# Patient Record
Sex: FEMALE | URBAN - METROPOLITAN AREA
[De-identification: names, ages, dates, MRNs, and addresses within clinical notes are randomized per-mention and may not be internally consistent; named-entity substitution may affect disease eponyms.]

---

## 2020-10-29 ENCOUNTER — APPOINTMENT (RX ONLY)
Dept: URBAN - METROPOLITAN AREA CLINIC 55 | Facility: CLINIC | Age: 70
Setting detail: DERMATOLOGY
End: 2020-10-29

## 2020-10-29 DIAGNOSIS — Z41.9 ENCOUNTER FOR PROCEDURE FOR PURPOSES OTHER THAN REMEDYING HEALTH STATE, UNSPECIFIED: ICD-10-CM

## 2020-10-29 PROCEDURE — ? ADDITIONAL NOTES

## 2020-10-29 PROCEDURE — ? SUBCUTANEOUS HORMONE PELLET IMPLANTATION

## 2020-10-29 ASSESSMENT — LOCATION SIMPLE DESCRIPTION DERM: LOCATION SIMPLE: RIGHT BUTTOCK

## 2020-10-29 ASSESSMENT — LOCATION DETAILED DESCRIPTION DERM: LOCATION DETAILED: RIGHT BUTTOCK

## 2020-10-29 ASSESSMENT — LOCATION ZONE DERM: LOCATION ZONE: TRUNK

## 2020-10-29 NOTE — PROCEDURE: ADDITIONAL NOTES
Detail Level: Simple
Additional Notes: Patient has scheduled annual physical with PCP December 2020.

## 2021-04-01 ENCOUNTER — APPOINTMENT (RX ONLY)
Dept: URBAN - METROPOLITAN AREA CLINIC 55 | Facility: CLINIC | Age: 71
Setting detail: DERMATOLOGY
End: 2021-04-01

## 2021-04-01 DIAGNOSIS — Z41.9 ENCOUNTER FOR PROCEDURE FOR PURPOSES OTHER THAN REMEDYING HEALTH STATE, UNSPECIFIED: ICD-10-CM

## 2021-04-01 PROCEDURE — ? SUBCUTANEOUS HORMONE PELLET IMPLANTATION

## 2021-04-01 ASSESSMENT — LOCATION DETAILED DESCRIPTION DERM: LOCATION DETAILED: LEFT BUTTOCK

## 2021-04-01 ASSESSMENT — LOCATION SIMPLE DESCRIPTION DERM: LOCATION SIMPLE: LEFT BUTTOCK

## 2021-04-01 ASSESSMENT — LOCATION ZONE DERM: LOCATION ZONE: TRUNK

## 2021-04-01 NOTE — PROCEDURE: SUBCUTANEOUS HORMONE PELLET IMPLANTATION
Anesthesia Type: 1% lidocaine with epinephrine and a 1:10 solution of 8.4% sodium bicarbonate
Hide Expiration Date: No
Pellet Placement Text (Pellets Of Testosterone.....Xxx): and or estradiol were then placed into the trocar and slid into place under the skin, approximately 1.5 inches from the incision without difficulty. The trocar was then withdrawn and the wound was closed with steri-strips and a dressing was applied. The patient applied pressure to the wound for 4 minutes. There was less than 1cc of blood loss during the case. The patient was given a post-op instruction sheet and has been instructed to call us if she experiences any problems.
Pre-Operative Text: The patient was placed in a lateral recumbent position. The skin 2-3 inches below the waistline in the upper outer quadrant of the buttocks was cleansed with alcohol. See diagram.
Anesthesia Volume In Cc: 8
Total Testosterone Mg (Optional): 175
Number Of Testosterone Pellets: 3
Number Of Estradiol Pellets: 1
Anesthesia Text (1% Lidocaine With Epinephrine (1.5cc)......): was injected subdermally 2 inches in a horizontal fashion to achieve local anesthesia.
Total Estradiol Mg (Optional): 10
Testosterone Expiration Date (Optional): 12/30/21; 12/23/21; 12/23/21
Detail Level: None
Post-Care Instructions: I reviewed with the patient in detail post-care instructions. Patient understands to call the clinic if there is any redness, swelling or pain. A detailed post-insertion hand was reviewed and provided to the patient.
Incision And Trocar Text: After ensuring adequate anesthesia, a 4mm stab incision was made. A 3.5mm trocar was introduced through the incision into the subcutaneous fat coursing horizontally toward the hip. The stylus was then removed.
Price (Use Numbers Only, No Special Characters Or $): 209
Testosterone Lot Number (Optional): [id number]; [id number]; [id number]
Estradiol Expiration Date (Optional): 12/11/21
Estradiol Lot Number (Optional): [id number]

## 2021-04-06 ENCOUNTER — APPOINTMENT (RX ONLY)
Dept: URBAN - METROPOLITAN AREA CLINIC 55 | Facility: CLINIC | Age: 71
Setting detail: DERMATOLOGY
End: 2021-04-06

## 2021-04-06 DIAGNOSIS — Z41.9 ENCOUNTER FOR PROCEDURE FOR PURPOSES OTHER THAN REMEDYING HEALTH STATE, UNSPECIFIED: ICD-10-CM

## 2021-04-06 PROCEDURE — ? COSMETIC CONSULTATION: GENERAL

## 2021-04-09 ENCOUNTER — APPOINTMENT (RX ONLY)
Dept: URBAN - METROPOLITAN AREA CLINIC 55 | Facility: CLINIC | Age: 71
Setting detail: DERMATOLOGY
End: 2021-04-09

## 2021-04-09 DIAGNOSIS — Z41.9 ENCOUNTER FOR PROCEDURE FOR PURPOSES OTHER THAN REMEDYING HEALTH STATE, UNSPECIFIED: ICD-10-CM

## 2021-04-09 PROCEDURE — ? SCULPTRA

## 2021-04-09 PROCEDURE — ? FILLERS

## 2021-04-09 NOTE — PROCEDURE: SCULPTRA
Right Dorsal Hand Filler Volume In Cc: 0
Show Second Additional Location?: Yes
Additional Anesthesia Volume In Cc: 6
Show Right And Left Temple Volume?: No
Vials Reconstituted (Required For Inventory): 1
Injection Technique: The Sculptra was injected to the areas shown in black with a 25g cannula after prepping the skin with alcohol and/or chlorhexidine and providing appropriate anesthesia.
Anesthesia Type: 1% lidocaine with epinephrine
Consent: Verbal consent was obtained.
Anesthesia Volume In Cc: 0.5
Dilution Method: The Sculptra was reconstituted with 8cc sterile water and 2cc 2% plain lidocaine for each vial.
Detail Level: Simple
Volumizer: Sculptra
Map Statement: See Attached Map for Complete Details.
Expiration Date (Month Year): 9/30/2023
Lot #: [id number]
Post-Care Instructions: After care instructions were provided to the patient.

## 2021-04-09 NOTE — PROCEDURE: FILLERS
Expiration Date (Month Year): 7/3/23
Additional Area 3 Volume In Cc: 0
Include Cannula Information In Note?: No
Include Cannula Size?: 27G
Filler: RHA 3
Marionette Lines Filler Volume In Cc: 1
Map Statment: See Attach Map for Complete Details
Filler: RHA 4
Expiration Date (Month Year): 08/21/2023
Anesthesia Type: 1% lidocaine with epinephrine
Expiration Date (Month Year): 8/21/23
Lot #: [id number]
Anesthesia Volume In Cc: 0.5
Detail Level: Simple
Consent: Verbal consent was obtained.
Filler Comments: Cheeks, lateral TT
Lot #: [id number]
Include Cannula Information In Note?: Yes
Post-Care Instructions: After care instructions were provided to the patient.

## 2021-05-14 ENCOUNTER — RX ONLY (OUTPATIENT)
Age: 71
Setting detail: RX ONLY
End: 2021-05-14

## 2021-05-14 RX ORDER — PROGESTERONE 200 MG/1
CAPSULE ORAL
Qty: 90 | Refills: 1 | Status: ERX | COMMUNITY
Start: 2021-05-14

## 2021-05-21 ENCOUNTER — APPOINTMENT (RX ONLY)
Dept: URBAN - METROPOLITAN AREA CLINIC 55 | Facility: CLINIC | Age: 71
Setting detail: DERMATOLOGY
End: 2021-05-21

## 2021-05-21 DIAGNOSIS — Z41.9 ENCOUNTER FOR PROCEDURE FOR PURPOSES OTHER THAN REMEDYING HEALTH STATE, UNSPECIFIED: ICD-10-CM

## 2021-05-21 PROCEDURE — ? SCULPTRA

## 2021-05-21 NOTE — PROCEDURE: SCULPTRA
Right Pa Filler Volume In Cc: 0
Lot #: [id number]
Show Zygomatic Arches Volume?: Yes
Show Right And Left Mmc Volume?: No
Anesthesia Volume In Cc: 0.5
Consent: Written consent was obtained.
Detail Level: Simple
Vials Reconstituted (Required For Inventory): 1
Volumizer: Sculptra
Additional Anesthesia Volume In Cc: 6
Dilution Method: The Sculptra was reconstituted with 8cc sterile water and 2cc 2% plain lidocaine for each vial.
Post-Care Instructions: After care instructions were provided to the patient.
Injection Technique: The Sculptra was injected to the areas shown in black with a 25g cannula after prepping the skin with alcohol and/or chlorhexidine and providing appropriate anesthesia.
Map Statement: See Attached Map for Complete Details.
Expiration Date (Month Year): 9/30/23
Anesthesia Type: 1% lidocaine with epinephrine

## 2021-07-28 ENCOUNTER — APPOINTMENT (RX ONLY)
Dept: URBAN - METROPOLITAN AREA CLINIC 55 | Facility: CLINIC | Age: 71
Setting detail: DERMATOLOGY
End: 2021-07-28

## 2021-07-28 DIAGNOSIS — Z41.9 ENCOUNTER FOR PROCEDURE FOR PURPOSES OTHER THAN REMEDYING HEALTH STATE, UNSPECIFIED: ICD-10-CM

## 2021-07-28 PROCEDURE — ? SCULPTRA

## 2021-07-28 NOTE — PROCEDURE: SCULPTRA
Show Right And Left Pa Volume?: No
Right Tear Trough Filler Volume In Cc: 0
Volumizer: Sculptra
Show Nasolabial Folds Volume?: Yes
Post-Care Instructions: After care instructions were provided to the patient.
Expiration Date (Month Year): 1/31/2024
Additional Anesthesia Volume In Cc: 6
Consent: Written consent was obtained.
Dilution Method: The Sculptra was reconstituted with 8cc sterile water and 2cc 2% plain lidocaine for each vial.
Detail Level: Simple
Anesthesia Volume In Cc: 0.5
Vials Reconstituted (Required For Inventory): 1
Injection Technique: The Sculptra was injected to the areas shown in black with a 25g cannula after prepping the skin with alcohol and/or chlorhexidine and providing appropriate anesthesia.
Map Statement: See Attached Map for Complete Details.
Lot #: [id number]
Anesthesia Type: 1% lidocaine with epinephrine

## 2021-09-08 ENCOUNTER — APPOINTMENT (RX ONLY)
Dept: URBAN - METROPOLITAN AREA CLINIC 55 | Facility: CLINIC | Age: 71
Setting detail: DERMATOLOGY
End: 2021-09-08

## 2021-09-08 DIAGNOSIS — Z41.9 ENCOUNTER FOR PROCEDURE FOR PURPOSES OTHER THAN REMEDYING HEALTH STATE, UNSPECIFIED: ICD-10-CM

## 2021-09-08 PROCEDURE — ? FILLERS

## 2021-09-08 PROCEDURE — ? SUBCUTANEOUS HORMONE PELLET IMPLANTATION

## 2021-09-08 ASSESSMENT — LOCATION SIMPLE DESCRIPTION DERM: LOCATION SIMPLE: RIGHT BUTTOCK

## 2021-09-08 ASSESSMENT — LOCATION ZONE DERM: LOCATION ZONE: TRUNK

## 2021-09-08 ASSESSMENT — LOCATION DETAILED DESCRIPTION DERM: LOCATION DETAILED: RIGHT BUTTOCK

## 2021-09-08 NOTE — PROCEDURE: FILLERS
Dorsal Hands Filler Volume In Cc: 0
Include Cannula Size?: 27G
Include Cannula Information In Note?: No
Filler: Jones Sandoval
Anesthesia Type: 1% lidocaine with epinephrine
Consent: Written consent was obtained.
Lot #: [id number]
Map Statment: See Attach Map for Complete Details
Expiration Date (Month Year): 01/31/2024
Include Cannula Information In Note?: Yes
Anesthesia Volume In Cc: 0.5
Detail Level: Simple
Post-Care Instructions: After care instructions were provided to the patient.

## 2021-09-08 NOTE — PROCEDURE: SUBCUTANEOUS HORMONE PELLET IMPLANTATION
Anesthesia Type: 1% lidocaine with epinephrine and a 1:10 solution of 8.4% sodium bicarbonate
Detail Level: None
Total Estradiol Mg (Optional): 6
Estradiol Lot Number (Optional): [id number]
Hide Expiration Date: No
Pre-Operative Text: The patient was placed in a lateral recumbent position. The skin 2-3 inches below the waistline in the upper outer quadrant of the buttocks was cleansed with alcohol. See diagram.
Anesthesia Volume In Cc: 8
Post-Care Instructions: I reviewed with the patient in detail post-care instructions. Patient understands to call the clinic if there is any redness, swelling or pain. A detailed post-insertion hand was reviewed and provided to the patient.
Incision And Trocar Text: After ensuring adequate anesthesia, a 4mm stab incision was made. A 3.5mm trocar was introduced through the incision into the subcutaneous fat coursing horizontally toward the hip. The stylus was then removed.
Testosterone Lot Number (Optional): [id number]; [id number]; [id number]
Number Of Estradiol Pellets: 1
Total Testosterone Mg (Optional): 175
Pellet Placement Text (Pellets Of Testosterone.....Xxx): and or estradiol were then placed into the trocar and slid into place under the skin, approximately 1.5 inches from the incision without difficulty. The trocar was then withdrawn and the wound was closed with steri-strips and a dressing was applied. The patient applied pressure to the wound for 4 minutes. There was less than 1cc of blood loss during the case. The patient was given a post-op instruction sheet and has been instructed to call us if she experiences any problems.
Number Of Testosterone Pellets: 3
Anesthesia Text (1% Lidocaine With Epinephrine (1.5cc)......): was injected subdermally 2 inches in a horizontal fashion to achieve local anesthesia.
Estradiol Expiration Date (Optional): 05/19/22
Testosterone Expiration Date (Optional): 7/3/22; 6/27/22; 5/16/22
Price (Use Numbers Only, No Special Characters Or $): 571

## 2021-11-04 ENCOUNTER — APPOINTMENT (RX ONLY)
Dept: URBAN - METROPOLITAN AREA CLINIC 55 | Facility: CLINIC | Age: 71
Setting detail: DERMATOLOGY
End: 2021-11-04

## 2021-11-04 VITALS — WEIGHT: 186.4 LBS | HEIGHT: 68.5 IN

## 2021-11-04 DIAGNOSIS — Z41.9 ENCOUNTER FOR PROCEDURE FOR PURPOSES OTHER THAN REMEDYING HEALTH STATE, UNSPECIFIED: ICD-10-CM

## 2021-11-04 PROCEDURE — ? COOLSCULPTING

## 2021-11-04 NOTE — PROCEDURE: COOLSCULPTING
Treatment Administered By (Optional): Kat
Suction Settings: The suction settings were per protocol.
Consent obtained, risks reviewed.
Intro: Prior to treatment, the area was cleaned with alcohol and marked out with a marking pen. The gel sheet was then applied uniformly. The applicator was applied to the skin with good contact.
Applicator Size: CoolAdvantage Petite Core
Time (Minutes - Will Only Render If Nonzero): 35
Time (Minutes - Will Only Render If Nonzero): 0
Applicator Size: CoolAdvantage Core
Applicator Size: CoolAdvantage Curve
Detail Level: Simple
Device: Coolsculpting
Applicator Size: standard applicator
Patient Weight (Optional): 186
Post Treatment: After treatment, the applicator was removed from the skin. 2 minute immediate post procedure massage was administered.
Location 1: lower abdomen (left)
Location 2: lower abdomen (right)
Price (Use Numbers Only, No Special Characters Or $): 900

## 2022-01-26 ENCOUNTER — APPOINTMENT (RX ONLY)
Dept: URBAN - METROPOLITAN AREA CLINIC 55 | Facility: CLINIC | Age: 72
Setting detail: DERMATOLOGY
End: 2022-01-26

## 2022-01-26 DIAGNOSIS — Z41.9 ENCOUNTER FOR PROCEDURE FOR PURPOSES OTHER THAN REMEDYING HEALTH STATE, UNSPECIFIED: ICD-10-CM

## 2022-01-26 PROCEDURE — ? SUBCUTANEOUS HORMONE PELLET IMPLANTATION

## 2022-01-26 ASSESSMENT — LOCATION DETAILED DESCRIPTION DERM: LOCATION DETAILED: LEFT BUTTOCK

## 2022-01-26 ASSESSMENT — LOCATION ZONE DERM: LOCATION ZONE: TRUNK

## 2022-01-26 ASSESSMENT — LOCATION SIMPLE DESCRIPTION DERM: LOCATION SIMPLE: LEFT BUTTOCK

## 2022-01-26 NOTE — PROCEDURE: SUBCUTANEOUS HORMONE PELLET IMPLANTATION
Post-Care Instructions: I reviewed with the patient in detail post-care instructions. Patient understands to call the clinic if there is any redness, swelling or pain. A detailed post-insertion hand was reviewed and provided to the patient.
Incision And Trocar Text: After ensuring adequate anesthesia, a 4mm stab incision was made. A 3.5mm trocar was introduced through the incision into the subcutaneous fat coursing horizontally toward the hip. The stylus was then removed.
Detail Level: None
Pre-Operative Text: The patient was placed in a lateral recumbent position. The skin 2-3 inches below the waistline in the upper outer quadrant of the buttocks was cleansed with alcohol. See diagram.
Estradiol Lot Number (Optional): [id number]
Testosterone Lot Number (Optional): [id number] 100mg, [id number] 50mg, [id number] 25mg
Anesthesia Type: 1% lidocaine with epinephrine and a 1:10 solution of 8.4% sodium bicarbonate
Price (Use Numbers Only, No Special Characters Or $): 879
Total Testosterone Mg (Optional): 175
Number Of Estradiol Pellets: 1
Hide Expiration Date: No
Estradiol Expiration Date (Optional): 10/27/2022
Number Of Testosterone Pellets: 3
Total Estradiol Mg (Optional): 10
Pellet Placement Text (Pellets Of Testosterone.....Xxx): and or estradiol were then placed into the trocar and slid into place under the skin, approximately 1.5 inches from the incision without difficulty. The trocar was then withdrawn and the wound was closed with steri-strips and a dressing was applied. The patient applied pressure to the wound for 4 minutes. There was less than 1cc of blood loss during the case. The patient was given a post-op instruction sheet and has been instructed to call us if she experiences any problems.
Testosterone Expiration Date (Optional): 10/15/2022, 9/19/2022, 9/15/2022
Anesthesia Text (1% Lidocaine With Epinephrine (1.5cc)......): was injected subdermally 2 inches in a horizontal fashion to achieve local anesthesia.
Anesthesia Volume In Cc: 8

## 2022-06-14 ENCOUNTER — APPOINTMENT (RX ONLY)
Dept: URBAN - METROPOLITAN AREA CLINIC 55 | Facility: CLINIC | Age: 72
Setting detail: DERMATOLOGY
End: 2022-06-14

## 2022-06-14 DIAGNOSIS — Z41.9 ENCOUNTER FOR PROCEDURE FOR PURPOSES OTHER THAN REMEDYING HEALTH STATE, UNSPECIFIED: ICD-10-CM

## 2022-06-14 PROCEDURE — ? SUBCUTANEOUS HORMONE PELLET IMPLANTATION

## 2022-06-14 NOTE — PROCEDURE: SUBCUTANEOUS HORMONE PELLET IMPLANTATION
Detail Level: None
Mabel Lot Number: No
Pellet Placement Text (Pellets Of Testosterone.....Xxx): and or estradiol were then placed into the trocar and slid into place under the skin, approximately 1.5 inches from the incision without difficulty. The trocar was then withdrawn and the wound was closed with steri-strips and a dressing was applied. The patient applied pressure to the wound for 4 minutes. There was less than 1cc of blood loss during the case. The patient was given a post-op instruction sheet and has been instructed to call us if she experiences any problems.
Pre-Operative Text: The patient was placed in a lateral recumbent position. The skin 2-3 inches below the waistline in the upper outer quadrant of the buttocks was cleansed with alcohol. See diagram.
Anesthesia Text (1% Lidocaine With Epinephrine (1.5cc)......): was injected subdermally 2 inches in a horizontal fashion to achieve local anesthesia.
Testosterone Lot Number (Optional): [id number] 50mg; [id number] 100mg; [id number] 25mg
Anesthesia Type: 1% lidocaine with epinephrine and a 1:10 solution of 8.4% sodium bicarbonate
Post-Care Instructions: I reviewed with the patient in detail post-care instructions. Patient understands to call the clinic if there is any redness, swelling or pain. A detailed post-insertion hand was reviewed and provided to the patient.
Number Of Estradiol Pellets: 1
Total Estradiol Mg (Optional): 6
Testosterone Expiration Date (Optional): 12/09/22; 11/11/22; 12/09/22
Total Testosterone Mg (Optional): 175
Price (Use Numbers Only, No Special Characters Or $): 886
Estradiol Expiration Date (Optional): 12/09/22
Number Of Testosterone Pellets: 3
Incision And Trocar Text: After ensuring adequate anesthesia, a 4mm stab incision was made. A 3.5mm trocar was introduced through the incision into the subcutaneous fat coursing horizontally toward the hip. The stylus was then removed.
Estradiol Lot Number (Optional): [id number]
Anesthesia Volume In Cc: 8

## 2022-12-07 ENCOUNTER — APPOINTMENT (RX ONLY)
Dept: URBAN - METROPOLITAN AREA CLINIC 55 | Facility: CLINIC | Age: 72
Setting detail: DERMATOLOGY
End: 2022-12-07

## 2022-12-07 DIAGNOSIS — Z41.9 ENCOUNTER FOR PROCEDURE FOR PURPOSES OTHER THAN REMEDYING HEALTH STATE, UNSPECIFIED: ICD-10-CM

## 2022-12-07 PROCEDURE — ? HYALURONIDASE INJECTION

## 2022-12-07 PROCEDURE — ? SCULPTRA

## 2022-12-07 PROCEDURE — ? FILLERS

## 2022-12-07 PROCEDURE — ? INVENTORY

## 2022-12-07 ASSESSMENT — LOCATION DETAILED DESCRIPTION DERM
LOCATION DETAILED: RIGHT CENTRAL MALAR CHEEK
LOCATION DETAILED: LEFT CENTRAL MALAR CHEEK

## 2022-12-07 ASSESSMENT — LOCATION SIMPLE DESCRIPTION DERM
LOCATION SIMPLE: RIGHT CHEEK
LOCATION SIMPLE: LEFT CHEEK

## 2022-12-07 ASSESSMENT — LOCATION ZONE DERM: LOCATION ZONE: FACE

## 2022-12-07 NOTE — PROCEDURE: SCULPTRA
Right Nasolabial Fold Filler Volume In Cc: 0
Show Mental Crease Volume?: Yes
Consent: Written consent obtained.
Show Right And Left Temple Volume?: No
Detail Level: Detailed
Post-Care Instructions: After care instructions were provided to the patient.
Show Inventory Tab: Show
Anesthesia Type: 1% lidocaine with epinephrine
Dilution Method: The Sculptra was reconstituted with 8 ml of sterile water and 2cc 2% plain lidocaine for each vial.
Anesthesia Volume In Cc: 0.5
Injection Technique: The Sculptra was injected to the areas shown in black with a 25g cannula after prepping the skin with alcohol and /or chlorhexidine and providing appropriate anesthesia.
Vials Reconstituted (Required For Inventory): 1

## 2022-12-07 NOTE — PROCEDURE: FILLERS
Post-Care Instructions: After care instructions were provided verbally and in writing.
Nasolabial Folds Filler Volume In Cc: 0
Use Map Statement For Sites (Optional): No
Filler: Juvederm Voluma XC
Map Statment: See Attach Map for Complete Details
Include Cannula Information In Note?: Yes
Anesthesia Type: 1% lidocaine with epinephrine
Anesthesia Volume In Cc: 0.5
Include Cannula Size?: 27G
Detail Level: Detailed
Consent was obtained.

## 2022-12-07 NOTE — PROCEDURE: HYALURONIDASE INJECTION
Detail Level: Detailed
Total Volume (Ccs): 0.3
Hyaluronidase Preparation: hyaluronidase
Consent: The risks of contour defects and dimpling of the skin were reviewed with the patient prior to the injection.

## 2023-03-02 ENCOUNTER — APPOINTMENT (RX ONLY)
Dept: URBAN - METROPOLITAN AREA CLINIC 55 | Facility: CLINIC | Age: 73
Setting detail: DERMATOLOGY
End: 2023-03-02

## 2023-03-02 VITALS — WEIGHT: 175 LBS | HEIGHT: 68 IN

## 2023-03-02 DIAGNOSIS — Z41.9 ENCOUNTER FOR PROCEDURE FOR PURPOSES OTHER THAN REMEDYING HEALTH STATE, UNSPECIFIED: ICD-10-CM

## 2023-03-02 PROCEDURE — ? COOLSCULPTING

## 2023-03-02 PROCEDURE — ? INVENTORY

## 2023-03-02 NOTE — PROCEDURE: COOLSCULPTING
Applicator Size: standard applicator
Suction Settings: The suction settings were per protocol.
Location 1: mid abdomen (right)
Number Of Cycles: 1
Patient Weight (Optional): 175
Treatment Administered By (Optional): Kat
Location 2: mid abdomen (left)
Time (Minutes - Will Only Render If Nonzero): 0
Device: Coolsculpting
Applicator Size: CoolAdvantage Plus
Applicator Size: CalciMed[employer] PRO applicator
Time (Minutes - Will Only Render If Nonzero): 75
Post Treatment: After treatment, the applicator was removed from the skin and a two minute massage was performed according to protocol.
Intro: Prior to treatment, the area was cleaned with alcohol and marked out with a marking pen. The gel sheet was then applied uniformly. The applicator was applied to the skin with good contact.
Time (Minutes - Will Only Render If Nonzero): 45
Detail Level: Zone
Applicator Size: CoolAdvantage Core
Time (Minutes - Will Only Render If Nonzero): 35
Consent obtained, risks reviewed.

## 2023-05-30 ENCOUNTER — APPOINTMENT (RX ONLY)
Dept: URBAN - METROPOLITAN AREA CLINIC 55 | Facility: CLINIC | Age: 73
Setting detail: DERMATOLOGY
End: 2023-05-30

## 2023-05-30 VITALS — WEIGHT: 180 LBS

## 2023-05-30 DIAGNOSIS — Z41.9 ENCOUNTER FOR PROCEDURE FOR PURPOSES OTHER THAN REMEDYING HEALTH STATE, UNSPECIFIED: ICD-10-CM

## 2023-05-30 PROCEDURE — ? INVENTORY

## 2023-05-30 PROCEDURE — ? COSMETIC FOLLOW-UP

## 2023-05-30 NOTE — PROCEDURE: COSMETIC FOLLOW-UP
Global Improvement: Minimal
Detail Level: Simple
Patient Satisfaction: Pleased
Treatment (Optional): CoolSculpting
Side Effects Or Complications: None

## 2023-08-30 ENCOUNTER — APPOINTMENT (RX ONLY)
Dept: URBAN - METROPOLITAN AREA CLINIC 55 | Facility: CLINIC | Age: 73
Setting detail: DERMATOLOGY
End: 2023-08-30

## 2023-08-30 DIAGNOSIS — Z41.9 ENCOUNTER FOR PROCEDURE FOR PURPOSES OTHER THAN REMEDYING HEALTH STATE, UNSPECIFIED: ICD-10-CM

## 2023-08-30 PROCEDURE — ? FILLERS

## 2023-08-30 PROCEDURE — ? INVENTORY

## 2023-08-30 PROCEDURE — ? SCULPTRA

## 2023-08-30 NOTE — PROCEDURE: FILLERS
Tear Troughs Filler Volume In Cc: 0
Anesthesia Volume In Cc: 0.5
Include Cannula Information In Note?: No
Anesthesia Type: 1% lidocaine with epinephrine
Include Cannula Information In Note?: Yes
Include Cannula Size?: 27G
Consent was obtained.
Detail Level: Detailed
Post-Care Instructions: After care instructions were provided verbally and in writing.
Filler: Restylane Defyne
Map Statment: See Attach Map for Complete Details

## 2023-08-30 NOTE — PROCEDURE: SCULPTRA
Left Nasolabial Fold Filler Volume In Cc: 0
Show Depth Variable?: Yes
Show Right And Left Jawline Volume?: No
Vials Reconstituted (Required For Inventory): 1
Show Inventory Tab: Show
Post-Care Instructions: After care instructions were provided to the patient.
Anesthesia Type: 1% lidocaine with epinephrine
Dilution Method: The Sculptra was reconstituted with 8 ml of sterile water and 2cc 2% plain lidocaine for each vial.
Anesthesia Volume In Cc: 0.5
Injection Technique: The Sculptra was injected to the areas shown in black with a 25g cannula after prepping the skin with alcohol and /or chlorhexidine and providing appropriate anesthesia.
Detail Level: Detailed
Consent: Written consent obtained.

## 2023-10-25 ENCOUNTER — APPOINTMENT (RX ONLY)
Dept: URBAN - METROPOLITAN AREA CLINIC 55 | Facility: CLINIC | Age: 73
Setting detail: DERMATOLOGY
End: 2023-10-25

## 2023-10-25 DIAGNOSIS — Z41.9 ENCOUNTER FOR PROCEDURE FOR PURPOSES OTHER THAN REMEDYING HEALTH STATE, UNSPECIFIED: ICD-10-CM

## 2023-10-25 PROCEDURE — ? COSMETIC CONSULTATION: GENERAL

## 2023-10-25 PROCEDURE — ? PALOMAR VECTUS LASER HAIR REMOVAL

## 2023-10-25 PROCEDURE — ? INVENTORY

## 2023-10-25 NOTE — PROCEDURE: PALOMAR VECTUS LASER HAIR REMOVAL
Detail Level: Detailed
Fluence In J/Cm2: 23
Post-Care Instructions: I reviewed with the patient in detail post-care instructions. Patient should avoid sun for a minimum of 4 weeks before and after treatment.
Pre-Procedure: Prior to proceeding the treatment areas were cleaned and all present put on their eye protection.
Topical Anesthesia Type: 20% benzocaine, 8% lidocaine, 4% tetracaine
Cooling: DCD setting
Rate (Hz): Medium
External Cooling Fan Speed: 0
Skintel Number (Optional): 14
Pulse Duration (Include Units): 15 ms
Optic Size: 12 x 12mm
Render Post-Care In The Note: No
Laser Type: diode 810nm
Treatment Number: 1
Post-Procedure Care: Immediate endpoint: perifollicular erythema and edema. Post care was reviewed with the patient and all patient questions were answered to their satisfaction.
Consent obtained, risks reviewed.

## 2023-11-22 ENCOUNTER — APPOINTMENT (RX ONLY)
Dept: URBAN - METROPOLITAN AREA CLINIC 55 | Facility: CLINIC | Age: 73
Setting detail: DERMATOLOGY
End: 2023-11-22

## 2023-11-22 DIAGNOSIS — Z41.9 ENCOUNTER FOR PROCEDURE FOR PURPOSES OTHER THAN REMEDYING HEALTH STATE, UNSPECIFIED: ICD-10-CM

## 2023-11-22 PROCEDURE — ? PALOMAR VECTUS LASER HAIR REMOVAL

## 2023-11-22 PROCEDURE — ? INVENTORY

## 2023-11-22 NOTE — PROCEDURE: PALOMAR VECTUS LASER HAIR REMOVAL
Fluence In J/Cm2: 24
Detail Level: Detailed
Post-Care Instructions: I reviewed with the patient in detail post-care instructions. Patient should avoid sun for a minimum of 4 weeks before and after treatment.
Post-Procedure Care: Immediate endpoint: perifollicular erythema and edema. Post care was reviewed with the patient and all patient questions were answered to their satisfaction.
Skintel Number (Optional): 17
Optic Size: 12 x 12mm
Render Post-Care In The Note: No
Treatment Number: 2
Topical Anesthesia Type: 23% lidocaine, 7% tetracaine
Pulse Duration (Include Units): 15 ms
Rate (Hz): Medium
Laser Type: diode 810nm
External Cooling Fan Speed: 0
Pre-Procedure: Prior to proceeding the treatment areas were cleaned and all present put on their eye protection.
Cooling: DCD setting
Consent obtained, risks reviewed.

## 2023-12-05 ENCOUNTER — APPOINTMENT (RX ONLY)
Dept: URBAN - METROPOLITAN AREA CLINIC 55 | Facility: CLINIC | Age: 73
Setting detail: DERMATOLOGY
End: 2023-12-05

## 2023-12-05 DIAGNOSIS — Z41.9 ENCOUNTER FOR PROCEDURE FOR PURPOSES OTHER THAN REMEDYING HEALTH STATE, UNSPECIFIED: ICD-10-CM

## 2023-12-05 PROCEDURE — ? IN-HOUSE DISPENSING PHARMACY

## 2023-12-05 PROCEDURE — ? INVENTORY

## 2023-12-05 NOTE — PROCEDURE: IN-HOUSE DISPENSING PHARMACY
Product 5 Units Dispensed: 0
Product 36 Unit Type: mg
Product 7 Refills: 2
Name Of Product 1: Anti-Aging Brightening Cream
Product 7 Units Dispensed: 1
Detail Level: Zone
Product 4 Unit Type: ml
Product 6 Application Directions: Apply nightly or as directed. Use SPF daily.
Product 3 Refills: 11
Send Charges To Patient Encounter: No
Product 2 Amount/Unit (Numbers Only): 30
Name Of Product 4: Hydroquinone 8% Emulsion
Name Of Product 2: Dapsone / Spironolactone Gel
Render Refills If Set To 0: Yes
Product 2 Application Directions: Apply nightly or as directed.
Name Of Product 7: Lidocaine 23% / Tetracaine 7% Cream
Name Of Product 6: Rosacea Triple Combination Gel
Name Of Product 3: Acne Triple Combination Gel
Product 7 Application Directions: Apply only as directed
Name Of Product 5: Hydrating Tretinoin 0.025% Cream

## 2023-12-12 ENCOUNTER — APPOINTMENT (RX ONLY)
Dept: URBAN - METROPOLITAN AREA CLINIC 55 | Facility: CLINIC | Age: 73
Setting detail: DERMATOLOGY
End: 2023-12-12

## 2023-12-12 DIAGNOSIS — Z41.9 ENCOUNTER FOR PROCEDURE FOR PURPOSES OTHER THAN REMEDYING HEALTH STATE, UNSPECIFIED: ICD-10-CM

## 2023-12-12 PROCEDURE — ? INVENTORY

## 2023-12-12 PROCEDURE — ? FRAXEL

## 2023-12-12 NOTE — PROCEDURE: FRAXEL
Was An Eye Shield Used?: No
Treatment Level: 1
Treatment Level: 7
Small Plastic Eye Shield Text: The ocular mucosa was anesthetized with tetracaine. Once adequate anesthesia was optained, small plastic eye shields were inserted and remained in place until the procedure was completed.
Number Of Passes: 8
Topical Anesthesia Type: 23% lidocaine, 7% tetracaine
Wavelength: 1550nm
Anesthesia Volume In Cc: 0.3
Depth In Microns (Use Numbers Only, No Special Characters Or $): 199
Location: Use Location Override
Small Metal Eye Shield Text: The ocular mucosa was anesthetized with tetracaine. Once adequate anesthesia was optained, small metal eye shields were inserted and remained in place until the procedure was completed.
Medium Plastic Eye Shield Text: The ocular mucosa was anesthetized with tetracaine. Once adequate anesthesia was optained, medium plastic eye shields were inserted and remained in place until the procedure was completed.
Depth In Microns (Use Numbers Only, No Special Characters Or $): 5392
Length Of Topical Anesthesia Application (Optional): 60 minutes
Total Coverage: 30%
Detail Level: Zone
Location: full face
Total Coverage: 20%
Medium Metal Eye Shield Text: The ocular mucosa was anesthetized with tetracaine. Once adequate anesthesia was optained, medium metal eye shields were inserted and remained in place until the procedure was completed.
Post-Care Instructions: Topical anesthetic removed with gauze. Skin cleansed with a gentle  and prepped with Hibiclens.\\nCO2 lift mask applied post treatment and advised to keep on for 1 hour. Ice packs sent home with patient. \\nI reviewed with the patient in detail post-care instructions. Patient should avoid sun until area fully healed.
Large Plastic Eye Shield Text: The ocular mucosa was anesthetized with tetracaine. Once adequate anesthesia was optained, large plastic eye shields were inserted and remained in place until the procedure was completed.
External Cooling Fan Speed: 5
Energy(Mj/Cm2): 40
Indication: resurfacing
Total Energy In Kj (Optional- Don't Include Units): 5.23
Energy(Mj/Cm2): 15
Large Metal Eye Shield Text: The ocular mucosa was anesthetized with tetracaine. Once adequate anesthesia was optained, large metal eye shields were inserted and remained in place until the procedure was completed.
Add Post-Care Below To The Note: Yes
Energy(Mj/Cm2): 70
Treatment Level: 3
Consent obtained, risks reviewed.
Anesthesia Type: 1% lidocaine with epinephrine

## 2024-01-01 NOTE — PROCEDURE: SUBCUTANEOUS HORMONE PELLET IMPLANTATION
Total Estradiol Mg (Optional): 10
Estradiol Lot Number (Optional): [id number]
Total Testosterone Mg (Optional): 175
Number Of Estradiol Pellets: 1
Number Of Testosterone Pellets: 3
Hide Expiration Date: No
Incision And Trocar Text: After ensuring adequate anesthesia, a 4mm stab incision was made. A 3.5mm trocar was introduced through the incision into the subcutaneous fat coursing horizontally toward the hip. The stylus was then removed.
Pellet Placement Text (Pellets Of Testosterone.....Xxx): And estradiol were then placed into the trocar and slid into place under the skin, approximately 1.5 inches from the incision without difficulty. The trocar was then withdrawn and the wound was closed with steri-strips and a dressing was applied. The patient applied pressure to the wound for 4 minutes. There was less than 1cc of blood loss during the case. The patient was given a post-op instruction sheet and has been instructed to call us if she experiences any problems.
Testosterone Expiration Date (Optional): 08/19/2021, 08/19/21, 07/02/21
Anesthesia Type: 1% lidocaine with epinephrine and a 1:10 solution of 8.4% sodium bicarbonate
Post-Care Instructions: I reviewed with the patient in detail post-care instructions. Patient understands to call the clinic if there is any redness, swelling or pain. A detailed post-insertion hand was reviewed and provided to the patient.
Anesthesia Text (1% Lidocaine With Epinephrine (1.5cc)......): was injected subdermally 2 inches in a horizontal fashion to achieve local anesthesia.
Estradiol Expiration Date (Optional): 06/24/21
Price (Use Numbers Only, No Special Characters Or $): 585
Testosterone Lot Number (Optional): [id number], [id number]
Anesthesia Volume In Cc: 8
Detail Level: None
Pre-Operative Text: The patient was placed in a lateral recumbent position. The skin 2-3 inches below the waistline in the upper rightouter quadrant of the buttocks was cleansed with alcohol.
Yes-Patient/Caregiver accepts free interpretation services...

## 2024-01-02 ENCOUNTER — APPOINTMENT (RX ONLY)
Dept: URBAN - METROPOLITAN AREA CLINIC 55 | Facility: CLINIC | Age: 74
Setting detail: DERMATOLOGY
End: 2024-01-02

## 2024-01-02 DIAGNOSIS — Z41.9 ENCOUNTER FOR PROCEDURE FOR PURPOSES OTHER THAN REMEDYING HEALTH STATE, UNSPECIFIED: ICD-10-CM

## 2024-01-02 PROCEDURE — ? INVENTORY

## 2024-01-02 PROCEDURE — ? SIGNATURE HYDRAFACIAL

## 2024-01-02 ASSESSMENT — LOCATION ZONE DERM: LOCATION ZONE: FACE

## 2024-01-02 ASSESSMENT — LOCATION SIMPLE DESCRIPTION DERM: LOCATION SIMPLE: GLABELLA

## 2024-01-02 ASSESSMENT — LOCATION DETAILED DESCRIPTION DERM: LOCATION DETAILED: GLABELLA

## 2024-01-02 NOTE — PROCEDURE: SIGNATURE HYDRAFACIAL
Number Of Passes: 0
Treatment Number: 1
Tip Override
Tip: Hydropeel Tip, Teal
Tip: Hydropeel Tip, Blue
Procedure: Fusion
Indication: anti-aging
Procedure: Extend and Protect
Procedure: Peel
Procedure: Exfoliation
Solution Override
Consent: Written consent obtained, risks reviewed including but not limited to crusting, scabbing, blistering, scarring, darker or lighter pigmentary change, bruising, and/or incomplete response.
Post-Care Instructions: I reviewed with the patient in detail post-care instructions. Patient should stay away from the sun and wear sun protection until treated areas are fully healed.
Procedure: Boost
Procedure: Extraction
Solution: Activ-4
Location: chest
Solution: Beta-HD
Tip: Hydropeel Tip, Clear
Solution: GlySal 15%

## 2024-01-03 ENCOUNTER — APPOINTMENT (RX ONLY)
Dept: URBAN - METROPOLITAN AREA CLINIC 55 | Facility: CLINIC | Age: 74
Setting detail: DERMATOLOGY
End: 2024-01-03

## 2024-01-03 DIAGNOSIS — Z41.9 ENCOUNTER FOR PROCEDURE FOR PURPOSES OTHER THAN REMEDYING HEALTH STATE, UNSPECIFIED: ICD-10-CM

## 2024-01-03 PROCEDURE — ? INVENTORY

## 2024-01-03 PROCEDURE — ? PALOMAR VECTUS LASER HAIR REMOVAL

## 2024-01-03 NOTE — PROCEDURE: PALOMAR VECTUS LASER HAIR REMOVAL
External Cooling Fan Speed: 0
Post-Care Instructions: I reviewed with the patient in detail post-care instructions. Patient should avoid sun for a minimum of 4 weeks before and after treatment.
Fluence In J/Cm2: 22
Post-Procedure Care: Immediate endpoint: perifollicular erythema and edema. Post care was reviewed with the patient and all patient questions were answered to their satisfaction.
Detail Level: Detailed
Rate (Hz): Medium
Optic Size: 12 x 12mm
Skintel Number (Optional): 19
Render Post-Care In The Note: No
Cooling: DCD setting
Pulse Duration (Include Units): 14 ms
Consent obtained, risks reviewed.
Treatment Number: 3
Laser Type: diode 810nm
Pre-Procedure: Prior to proceeding the treatment areas were cleaned and all present put on their eye protection.
Topical Anesthesia Type: 23% lidocaine, 7% tetracaine

## 2024-05-29 ENCOUNTER — APPOINTMENT (RX ONLY)
Dept: URBAN - METROPOLITAN AREA CLINIC 55 | Facility: CLINIC | Age: 74
Setting detail: DERMATOLOGY
End: 2024-05-29

## 2024-05-29 DIAGNOSIS — Z41.9 ENCOUNTER FOR PROCEDURE FOR PURPOSES OTHER THAN REMEDYING HEALTH STATE, UNSPECIFIED: ICD-10-CM

## 2024-05-29 PROCEDURE — ? SCULPTRA

## 2024-05-29 PROCEDURE — ? BOTOX

## 2024-05-29 NOTE — PROCEDURE: SCULPTRA
Show Local Anesthesia?: Yes
Mental Crease Filler Volume In Cc: 0
Show Right And Left Temple Volume?: No
Vials Reconstituted (Required For Inventory): 1
Dilution Method: The Sculptra was reconstituted with 8 ml of sterile water and 2cc 2% plain lidocaine for each vial.
Anesthesia Type: 1% lidocaine with epinephrine
Anesthesia Volume In Cc: 0.5
Injection Technique: The Sculptra was injected to the areas shown in black with a 25g cannula after prepping the skin with alcohol and /or chlorhexidine and providing appropriate anesthesia.
Consent: Written consent obtained.
Detail Level: Detailed
Post-Care Instructions: After care instructions were provided to the patient.
Show Inventory Tab: Show

## 2024-05-29 NOTE — PROCEDURE: BOTOX
Inferior Lateral Orbicularis Oculi Units: 0
Show Additional Area 3: Yes
Glabellar Complex Units: 20
Show Inventory Tab: Show
Show Right And Left Pupillary Line Units: No
Incrementing Botox Units: By 0.5 Units
Detail Level: Detailed
Consent was obtained.
Dilution (U/0.1 Cc): 4
Post-Care Instructions: Patient instructed to not lie down for 4 hours and limit physical activity for 24 hours.

## 2024-06-05 ENCOUNTER — APPOINTMENT (RX ONLY)
Dept: URBAN - METROPOLITAN AREA CLINIC 55 | Facility: CLINIC | Age: 74
Setting detail: DERMATOLOGY
End: 2024-06-05

## 2024-06-05 DIAGNOSIS — Z41.9 ENCOUNTER FOR PROCEDURE FOR PURPOSES OTHER THAN REMEDYING HEALTH STATE, UNSPECIFIED: ICD-10-CM

## 2024-06-05 PROCEDURE — ? FILLERS

## 2024-06-05 PROCEDURE — ? INVENTORY

## 2024-06-05 NOTE — PROCEDURE: FILLERS
Jawline Filler Volume In Cc: 0
Anesthesia Type: 1% lidocaine with epinephrine
Include Cannula Information In Note?: Yes
Include Cannula Information In Note?: No
Anesthesia Volume In Cc: 0.5
Include Cannula Size?: 27G
Consent was obtained.
Post-Care Instructions: After care instructions were provided verbally and in writing.
Detail Level: Detailed
Filler: Restylane Kysse
Map Statment: See Attach Map for Complete Details

## 2025-05-06 ENCOUNTER — APPOINTMENT (OUTPATIENT)
Dept: URBAN - METROPOLITAN AREA CLINIC 55 | Facility: CLINIC | Age: 75
Setting detail: DERMATOLOGY
End: 2025-05-06

## 2025-05-06 DIAGNOSIS — Z41.9 ENCOUNTER FOR PROCEDURE FOR PURPOSES OTHER THAN REMEDYING HEALTH STATE, UNSPECIFIED: ICD-10-CM

## 2025-05-06 PROCEDURE — ? SCULPTRA

## 2025-05-06 PROCEDURE — ? INVENTORY

## 2025-05-06 PROCEDURE — ? BOTOX

## 2025-05-06 PROCEDURE — ? FILLERS

## 2025-05-06 NOTE — PROCEDURE: SCULPTRA
Show Right And Left Cheek Volume?: No
Treatment Number: 7
Show Dorsal Hands Volume?: Yes
Nasolabial Folds Filler Volume In Cc: 0
Vials Reconstituted (Required For Inventory): 1
Post-Care Instructions: After care instructions were provided to the patient.
Consent: Written consent obtained.
Show Inventory Tab: Show
Injection Technique: The Sculptra was injected to the areas shown in black with a 25g cannula after prepping the skin with alcohol and /or chlorhexidine and providing appropriate anesthesia.
Anesthesia Volume In Cc: 0.5
Detail Level: Detailed
Dilution Method: The Sculptra was reconstituted with 8 ml of sterile water and 2cc 2% plain lidocaine for each vial.
Anesthesia Type: 1% lidocaine with epinephrine

## 2025-05-06 NOTE — PROCEDURE: BOTOX
Additional Area 4 Units: 0
Show Ucl Units: No
Glabellar Complex Units: 20
Show Levator Superior Units: Yes
Show Inventory Tab: Show
Dilution (U/0.1 Cc): 4
Consent was obtained.
Post-Care Instructions: Patient instructed to not lie down for 4 hours and limit physical activity for 24 hours.
Depressor Anguli Oris Units: 6
Detail Level: Detailed
Incrementing Botox Units: By 0.5 Units

## 2025-05-06 NOTE — PROCEDURE: FILLERS
Vermilion Lips Filler Volume In Cc: 0
Detail Level: Detailed
Include Cannula Size?: 25G
Include Cannula Information In Note?: Yes
Include Cannula Information In Note?: No
Consent was obtained.
Map Statment: See Attach Map for Complete Details
Post-Care Instructions: After care instructions were provided verbally and in writing.
Include Cannula Size?: 27G
Filler: Restylane Defyne
Anesthesia Type: 1% lidocaine with epinephrine
Anesthesia Volume In Cc: 0.5